# Patient Record
Sex: FEMALE | Race: WHITE | NOT HISPANIC OR LATINO | Employment: FULL TIME | URBAN - METROPOLITAN AREA
[De-identification: names, ages, dates, MRNs, and addresses within clinical notes are randomized per-mention and may not be internally consistent; named-entity substitution may affect disease eponyms.]

---

## 2020-12-11 ENCOUNTER — OFFICE VISIT (OUTPATIENT)
Dept: OBSTETRICS AND GYNECOLOGY | Facility: CLINIC | Age: 25
End: 2020-12-11
Payer: COMMERCIAL

## 2020-12-11 VITALS
BODY MASS INDEX: 27.72 KG/M2 | SYSTOLIC BLOOD PRESSURE: 110 MMHG | WEIGHT: 156.44 LBS | DIASTOLIC BLOOD PRESSURE: 76 MMHG | HEIGHT: 63 IN

## 2020-12-11 DIAGNOSIS — Z12.4 PAP SMEAR FOR CERVICAL CANCER SCREENING: Primary | ICD-10-CM

## 2020-12-11 DIAGNOSIS — Z30.09 ENCOUNTER FOR COUNSELING REGARDING INITIATION OF OTHER CONTRACEPTIVE MEASURE: ICD-10-CM

## 2020-12-11 DIAGNOSIS — N63.0 BREAST LUMP: ICD-10-CM

## 2020-12-11 PROCEDURE — 99385 PR PREVENTIVE VISIT,NEW,18-39: ICD-10-PCS | Mod: S$GLB,,, | Performed by: OBSTETRICS & GYNECOLOGY

## 2020-12-11 PROCEDURE — 3008F BODY MASS INDEX DOCD: CPT | Mod: CPTII,S$GLB,, | Performed by: OBSTETRICS & GYNECOLOGY

## 2020-12-11 PROCEDURE — 1126F AMNT PAIN NOTED NONE PRSNT: CPT | Mod: S$GLB,,, | Performed by: OBSTETRICS & GYNECOLOGY

## 2020-12-11 PROCEDURE — 99999 PR PBB SHADOW E&M-NEW PATIENT-LVL III: ICD-10-PCS | Mod: PBBFAC,,, | Performed by: OBSTETRICS & GYNECOLOGY

## 2020-12-11 PROCEDURE — 99385 PREV VISIT NEW AGE 18-39: CPT | Mod: S$GLB,,, | Performed by: OBSTETRICS & GYNECOLOGY

## 2020-12-11 PROCEDURE — 1126F PR PAIN SEVERITY QUANTIFIED, NO PAIN PRESENT: ICD-10-PCS | Mod: S$GLB,,, | Performed by: OBSTETRICS & GYNECOLOGY

## 2020-12-11 PROCEDURE — 3008F PR BODY MASS INDEX (BMI) DOCUMENTED: ICD-10-PCS | Mod: CPTII,S$GLB,, | Performed by: OBSTETRICS & GYNECOLOGY

## 2020-12-11 PROCEDURE — 88175 CYTOPATH C/V AUTO FLUID REDO: CPT

## 2020-12-11 PROCEDURE — 99999 PR PBB SHADOW E&M-NEW PATIENT-LVL III: CPT | Mod: PBBFAC,,, | Performed by: OBSTETRICS & GYNECOLOGY

## 2020-12-11 RX ORDER — NORGESTIMATE AND ETHINYL ESTRADIOL 0.25-0.035
1 KIT ORAL DAILY
Qty: 28 TABLET | Refills: 11 | Status: SHIPPED | OUTPATIENT
Start: 2020-12-11

## 2020-12-11 NOTE — LETTER
December 11, 2020      [facility] [place] [address]  [address], [address]  [facility] [address]  Phone: [phone]  Fax: [phone]       Patient: Florida Maravilla   YOB: 1995  Date of Visit: 12/11/2020    To Whom It May Concern:    Oswaldo Maravilla  was at [facility] on 12/11/2020. She may return to work on 12/11/2020 with no restrictions. If you have any questions or concerns, or if I can be of further assistance, please do not hesitate to contact me.    Sincerely,        Malini Andrea MD

## 2020-12-11 NOTE — PROGRESS NOTES
"Chief Complaint: Well Woman Exam     HPI:      Florida Maravilla is a 25 y.o. G0 who presents today for well woman exam.  LMP: Patient's last menstrual period was 2020 (exact date).  No issues, problems, or complaints. Specifically, patient denies abnormal vaginal bleeding, discharge, pelvic pain, urinary problems, or changes in appetite. Ms. Maravilla is currently sexually active with a single male partner. She is currently using condoms irregularly for contraception. She declines STD screening today.    Previous Pap:  has never had one     Gardasil:Unsure     Ms. Maravilla confirms that she wears her seatbelt when riding in the car and does not text while driving.     OB History        0    Para   0    Term   0       0    AB   0    Living   0       SAB   0    TAB   0    Ectopic   0    Multiple   0    Live Births   0           Obstetric Comments   Menarche- 11             ROS:     GENERAL: Denies unintentional weight gain or weight loss. Feeling well overall.   SKIN: Denies rash or lesions.   HEENT: Denies headaches, or vision changes.   CARDIOVASCULAR: Denies palpitations or chest pain.   RESPIRATORY: Denies shortness of breath or dyspnea on exertion.  BREASTS: Denies pain, lumps, or nipple discharge.   ABDOMEN: Denies abdominal pain, constipation, diarrhea, nausea, vomiting, change in appetite.  URINARY: Denies frequency, dysuria, hematuria.  NEUROLOGIC: Denies syncope or weakness.   PSYCHIATRIC: Denies depression, anxiety or mood swings.    Physical Exam:      PHYSICAL EXAM:  /76   Ht 5' 3" (1.6 m)   Wt 70.9 kg (156 lb 6.7 oz)   LMP 2020 (Exact Date)   BMI 27.71 kg/m²   Body mass index is 27.71 kg/m².     APPEARANCE: Well nourished, well developed, in no acute distress.  PSYCH: Appropriate mood and affect.  SKIN: No acne or hirsutism  NECK: Neck symmetric without masses or thyromegaly  NODES: No inguinal, axillary, or supraclavicular lymph node enlargement  ABDOMEN: Soft.  No " tenderness or masses.    CARDIOVASCULAR: No edema of peripheral extremities  BREASTS: Symmetrical, no skin changes or visible lesions.  No nipple discharge bilaterally. Firm masses in upper outer quadrants of both breasts, measuring about 3 cm in diameter - may be normal breast tissue as it is symmetrical but much firmer than typical breast tissue in that location.  PELVIC: Normal external genitalia without lesions.  Normal hair distribution.  Adequate perineal body, normal urethral meatus.  Vagina moist and well rugated without lesions or discharge.  Cervix pink, without lesions, discharge or tenderness.  No significant cystocele or rectocele.  Bimanual exam shows uterus to be normal size, regular, mobile and nontender.  Adnexa without masses or tenderness.      Assessment/Plan:     Pap smear for cervical cancer screening  -     Liquid-Based Pap Smear, Screening    Encounter for counseling regarding initiation of other contraceptive measure  -     norgestimate-ethinyl estradioL (ORTHO-CYCLEN) 0.25-35 mg-mcg per tablet; Take 1 tablet by mouth once daily.  Dispense: 28 tablet; Refill: 11    Breast lump  -     US Breast Bilateral Complete; Future; Expected date: 12/11/2020        Follow up in about 1 year (around 12/11/2021) for Annual Exam.    Counseling:     Patient was counseled today on current ASCCP pap guidelines, the recommendation for yearly pelvic exams, healthy diet and exercise routines, breast self awareness.She is to see her PCP for other health maintenance.     After discussing the risks, benefits, and alternatives, [place] Maravilla has opted to begin contraceptive treatment with oral contraceptive pills.   Today's discussion included:  1. When to initiate pills.  2. The need for regular compliance to ensure adequate contraceptive effect.  3. What to do in event of a missed pill.  4. Potential minor side effects such as breakthrough spotting, nausea, breast tenderness, weight changes, acne, headaches,  etc.   5. Potential though less likely major side effects such as MI, stroke, and deep vein thrombosis. She has been asked to report any signs of such serious problems immediately.    6. The need for a back-up form of contraception such as condoms during any cycle in which antibiotics are prescribed, and during the first cycle.   7. The need for barrier contraception to prevent exposure to sexually transmitted diseases. Ms. Maravilla was clearly counseled that OCP's cannot protect her against diseases such as HIV, herpes, and others.     All questions were answered, she voiced understanding, and she wishes to take the medication as prescribed.      Use of the [facility] Patient Portal discussed and encouraged during today's visit.

## 2020-12-14 ENCOUNTER — HOSPITAL ENCOUNTER (OUTPATIENT)
Dept: RADIOLOGY | Facility: HOSPITAL | Age: 25
Discharge: HOME | End: 2020-12-14
Attending: OBSTETRICS & GYNECOLOGY
Payer: COMMERCIAL

## 2020-12-14 DIAGNOSIS — N63.0 BREAST LUMP: ICD-10-CM

## 2020-12-14 PROCEDURE — 76642 ULTRASOUND BREAST LIMITED: CPT | Mod: 26,,, | Performed by: RADIOLOGY

## 2020-12-14 PROCEDURE — 76642 US BREAST BILATERAL LIMITED: ICD-10-PCS | Mod: 26,,, | Performed by: RADIOLOGY

## 2020-12-14 PROCEDURE — 76642 ULTRASOUND BREAST LIMITED: CPT | Mod: TC,50

## 2020-12-27 LAB
FINAL PATHOLOGIC DIAGNOSIS: NORMAL
Lab: NORMAL

## 2021-05-04 ENCOUNTER — PATIENT MESSAGE (OUTPATIENT)
Dept: RESEARCH | Facility: HOSPITAL | Age: 26
End: 2021-05-04